# Patient Record
(demographics unavailable — no encounter records)

---

## 2025-07-30 NOTE — HISTORY OF PRESENT ILLNESS
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 Admitted to CCU and treated with Shankar and milrinone. Initiated on Epoprostenol  CT Chest showed centrilobular ground-glass nodules, interlobular septal thickening, and mediastinal lymphadenopathy. Course complicated by pneumothorax following placement of PAC. He underwent bronchoscopy and EBUS on 12/10/24. Lymph node negative for malignancy.  Following stabilization in the ICU patient was transferred to RCU 24. Tested positive for RSV 24 s/p course of Ribavirin. Evaluated by dental 24 for jaw pain and found to have inflamed gingiva around #17 distal and lingual, with generalized plaque accumulation and gross debris/plaque s/p irrigation and cleaning. No active infection noted. RRT on 24 in setting of difficulty with Flolan infusion running through PICC Line. Flolan was subsequently changed to R IJ but then became symptomatic with tachycardia, flushing and chest pain. Patient was transferred to MICU for further monitoring. Transferred back to RCU 24. Left anterior chest wall Pandya catheter placemed for Flolan infusion 24.  Listed: Surgery: BILATERAL LUNG TRANSPLANT 25 Donor: CMV + / EBV + / Toxo - , PHS risk Y/N Recipient: CMV + / EBV + / Toxo - Induction: simluect 20mg x2, medrol 1 g  Total Ischemic Time: RIGHT LUN" LEFT LUN" Extubated:25  Hospital Course: Bilateral lung transplant 25. Extubated on 25. Postoperative course notable for right main stem bronchus with necrotic material and persistent air leak requiring prolonged chest tube placement. Fluid cultures 25 grew penicillium and paecilomyces for which he is on posaconazole. Also developed an acute L IJ DVT treated with lose dose Eliquis. He also tested positive for COVID initially on 3/4/25 treated with a 5 day course of Remdesivir. He remained asymptomatic. Patient discharged home 3/17/25.  TELE-Visit 3/19/2025: Initial visit is remote given positive COVID. He remains asymptomatic. Feels somewhat tired after climbing stairs at home. Also notes some chest tightness around the chest, which was present during his hospital stay. He is taking his medications as instructed using his pill box, which he had on him during the visit. He is taking strict aspiration precautions, which is also being enforced by his family. He is staying with his brother and designated caretaker Jayson, who is also present for the visit. He is ambulating daily without issue. Patient has vitals available for review, which were checked at home. Reported SpO2 97 - 99% on room air. /75. T 97.4 F. Wt 129 lbs.  CLINIC 3/26/2025: Patient presents to clinic for routine follow up appointment post hospital discharge. Recent COVID infection, last RVP negative, SPI improved, best to date, denies respiratory complications. Complained of sore throat/esophageal pain X 3 days. Thyroid assessed, appears normal, thyroid panel and DSA/Allosure drawn. Chest tube sutures removed, incisions healing well, no signs of infection noted. Medications reviewed with patient and brother, patient and brother verbalized understanding.  CLINIC 25: Slight decline in spirometry, not concerning as home sandy trend is stable, DSA/Allosure repeated for further surveillance. Patient complained of sore thrat X1-2 days, denies sick contact, RVP swab sent. Patient also complained of numbness and tingling in hands and fingers, will reassess next week and consider adding gabapentin. IS/Oi medications are stable; no changes this week. Routine labs to be repeated on Monday  CLINIC 25: RVP 4/3 + enterovirus and COVID. Started on a prednisone taper starting at 40 mg daily. Has further decline in spirometry from 2.63 last week to 2.49 today. Allosure was checked 3/26/25 and was 0.06%. DSA 0%. Has mild sore throat. No fevers. No shortness of breath. Patient's brother accompanied him today. Patient reports compliance with medications. His brother states that he is trying to encourage more physical activity.  Readmitted 4/10 - 25 for concern for respiratory symptoms, COVID infection with worsening PFTs. CT chest negative, + enterovirus on RVP. Covid was negative. Transplant ID consulted. Completed empiric course of levaquin inpatient.  CLINIC 2025: Mr. Buck was seen and examined by me and the ACP on 20[facility] at [address] [facility] [address] in a transplant office and I reviewed his data which was extensive the day before the scheduled visitation completely including lab data and radiographic information.  CLINIC 2025: CLINIC 2025: Patient clinically stable, spirometry improved, denies respiratory complications. Cellecpt increased to 500 mg BID. Posa decreased to 200mg as trough is 2.7. Metformin 500mg daily started by Dr. Claire on ; discontinued today as Metformin is not a preferred drug of choice for post-transplant patients. an appointment was facilitated with Dr. Toro on 25.  CLINIC 2025: Patient presents for routine follow up appointment. Reports feeling well. No respiratory complaints. Has intermittent feeling of chest tightness not associated with dyspnea. Also notes stiff legs in the middle of the night which improves during daytime. He reports compliance with medications.  CLINIC 2025: Patient presents for routine follow up appointment. Spirometry improved from last. Denies respiratory complications. Complained of neuropathy around incision sites, incisions are healed, no signs of infection. Patient also endorses leg cramps and stiffness., increase hydration with electrolytes encouraged. Patient states he has moved back to parent's house with his wife and son. Reports wife now helps him with medication and pill box.  CLINIC 2025: Spirometry declined. Stable respiratory status. Denies SOB or cough. Fell yesterday with associated head strike. Has bruise on scalp. Painful to touch. Also has mild right knee pain. Denies LOC. Denies headache. Sent patient to the ER for CT of head. DSA/Allosure drawn and sent today for declined Spirometry. Patient to follow up with Dr. Garcia to evaluate the need of Eliquis. Tacrolimus increased to 0.5mg BID and Cellcept increased to 750mg BID. Medication changes reviewed with patient, med-action updated and provided.  CLINIC 25: Spirometry improved, denies respiratory complications, reports doing well. Patient stated having right leg and right-hand cramps and stiffness, recommended to increase hydration. Pending appointment for Duplex to re-evaluate Left IJ DVT and possibly discontinuing Eliquis.  CLINIC 2025: Patients' spirometry stable, no complaints of respiratory issues. Tacro increased to 1mg BID, c/o muscle spasms, hydration encouraged. PAtient was referred to Neuropsychologist for cognitive impairment and patient has refused appointment. Lower ext dopplers completed- benign; Upper extremity dopplers scheduled for today.  CLINIC 2025: Spirometry improved, no respiratory complaints, reports doing well. Patient continues to complain of muscle spasm of B/L LE at times, hydration encourage and to remain active. Discussed with patient to be compliant with appointments. Updated med-action provided to patient.  CLINIC 2025: Spirometry stable, denies any respiratory complications, reports doing well. Lumbar sacral MRI ordered for continued muscle spasms and numbness to lower extremities. Patient reported heat rash to chest from being outside walking, Calamine lotion or Benadryl cream OTC recommended. Decreased Cellcept to 250mg BID due to neutropenia. updated Med-action provided to patient.  CLINIC 2025: Spirometry stable, denies any respiratory complications, reports doing well. Lumbar sacral MRI ordered for continued muscle spasms and numbness to lower extremities. No changes otherwise. Patient will repeat Tacrolimus level on 7/10/25.   CLINIC 2025: Patient denies respiratory complication; however, SPI declined from last. DSA/Prospera drawn, 6-monh bronch scheduled for tomorrow. Hydration encouraged, patient offers no complaints.   CLINIC 2025: *****   Employment status: [employer]:  DONOR CULTURES: CHECK UNET POD 1,3,7,10,14 2025: respiratory cx: pseudomonas fluoresecens 25 blood cx: ngtd 25 R BAL: <10k staph aureus 25 L BAL: <10k staph aureus 25 urine cx: ngtd 25: bld cx: staph epi?? - I'm not seeing this on unet?  CULTURES: : OR: rare yeast, rare mold, + pseudomonas flurosecens : OR: candida parapsilosis, s/p caspofungin : OR: rare penicillum 25 body fluid: rare candida, rare pseudomonas fluorescens 25 body fluid cx: rare candida parapsilosis, rare penicillium, rare paecilomyces 25: BAL: <10k growth 25 BAL: ngtd 25: NGTD 3/4/25: BAL + COVID (remdesivir 3/5 - 3/10) 3/7/25: RVP + COVID 3/13: RVP + COVID 2025: Rhinovirus->monitor/NGTD  TBBX: 1 MONTH: 3/4/25: A0Bx, C4D neg 3 MONTH: 2025 A0Bx GMS and C4d negative 6 MONTH: 2025 A0B0 GMS and C4d negative 9 MONTH: 10/2025 12 MONTH: 2026   DSA: 1 WEEK: (high risk): 25: 0%, NO DSA 2 WEEK: (high risk): 25: cPRA 0% NO DSA >2000 Late 1 month SENT 3/4/25; no Dsa, CPRA0% Repeated 3/26: 0% 3 MONTH: 2025 0% Repeat 25 0% 6 MONTH: 2025 0% 9 MONTH:10/2025 12 MONTH: 2026  ALLOSURE: Late 1 month: SENT 3/4/25: 0.19% Repeat 3/26: 0.06% Repeated 2025 3 MONTH: 2025 0.06% Repeat: Decline FEV1 2025: 0.08% 6 MONTH: 2025 0.08% 9 MONTH: 10/2025 12 MONTH: 2026   SPIROMETRY  inpatient  FVC 2.1  FEV1 1.82 2025 inpatient  FVC 2.21 FEV1 2.08 2025 inpatient  FVC 1.54 FEV1 1.16 2025 inpatient  FVC 2.07 FEV1 2.05 3/3/2025 inpatient  FVC 1.94 FEV1 1.77 3/10/2025 inpatient  FVC 2.04 FEV1 1.85 3/13/2025 inpatient  FVC 2.21 FEV1 2.06 3/17/2025 inpatient  FVC 2.06 FEV1 1.94 3/26/2025 [facility]: FVC 2.98 FEV1 2.92 2025 800 [facility]: FVC 2.70 FEV1 2.63 2025 800 [facility]: FVC 2.61 FEV1 2.49 2025 800 [facility]: FVC 2.53 FEV1 2.40 2025 800 [facility]: FVC 2.69 FEV1 2.58 2025 410 [facility] FVC 2.65 FEV1 2.58 2025 800 [facility]: FVC 2.71 FEV1 2.66 2025 800 [facility]: FVC 2.32 FEV1 2.25 2025 800 [facility]: FVC 2.87 FEV1 2.73 2025 800 Clinic FVC 2.79 FEV1 2.71 2025 800 [facility]: FVC 2.92 FEV1 2.85 2025 800 [facility]: FVC 2.93 FEV1 2.81. 2025 800 [facility]: FVC 2.96 FEV1 2.81 2025 800 [facility]: FVC 2.87 FEV1 2.67 2025 800 [facility]: ************************

## 2025-07-30 NOTE — END OF VISIT
[Time Spent: ___ minutes] : I have spent [unfilled] minutes of time on the encounter which excludes teaching and separately reported services. [FreeTextEntry3] : 31 year old male presented initially with cardiogenic shock and acute on chronic RH failure in setting of severe Group 1 PAH / PVOD s/p bilateral LT 1/31/2025, here for a follow up visit.  Clinically stable, on room air PE: CTA bilaterally CXR (07/28): no acute cardiopulmonary issues Spirometry: FEV1 stable at 2.70 L Tacro level on 07/28 was 9.2 ng/mL, increased to 1.0/0.5 mg BID this week Cont Cellcept 250 mg BID (reduced 2/2 low WBC) and Prednisone 17.5 mg daily OI PPX: Bactrim, Valcyte, Posaconazole (BAL 2/1 growing penicillium) BAL on 07/17 growing mold, pending speciation Posa level was 1.0 mcg/mL on 07/28, will discuss with transplant ID and pharmacy LTBI: INH/B6. Transplant ID follow up GERD: Aspiration precautions Rest as above

## 2025-07-30 NOTE — HISTORY OF PRESENT ILLNESS
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 Admitted to CCU and treated with Shankar and milrinone. Initiated on Epoprostenol  CT Chest showed centrilobular ground-glass nodules, interlobular septal thickening, and mediastinal lymphadenopathy. Course complicated by pneumothorax following placement of PAC. He underwent bronchoscopy and EBUS on 12/10/24. Lymph node negative for malignancy.  Following stabilization in the ICU patient was transferred to RCU 24. Tested positive for RSV 24 s/p course of Ribavirin. Evaluated by dental 24 for jaw pain and found to have inflamed gingiva around #17 distal and lingual, with generalized plaque accumulation and gross debris/plaque s/p irrigation and cleaning. No active infection noted. RRT on 24 in setting of difficulty with Flolan infusion running through PICC Line. Flolan was subsequently changed to R IJ but then became symptomatic with tachycardia, flushing and chest pain. Patient was transferred to MICU for further monitoring. Transferred back to RCU 24. Left anterior chest wall Pandya catheter placemed for Flolan infusion 24.  Listed: Surgery: BILATERAL LUNG TRANSPLANT 25 Donor: CMV + / EBV + / Toxo - , PHS risk Y/N Recipient: CMV + / EBV + / Toxo - Induction: simluect 20mg x2, medrol 1 g  Total Ischemic Time: RIGHT LUN" LEFT LUN" Extubated:25  Hospital Course: Bilateral lung transplant 25. Extubated on 25. Postoperative course notable for right main stem bronchus with necrotic material and persistent air leak requiring prolonged chest tube placement. Fluid cultures 25 grew penicillium and paecilomyces for which he is on posaconazole. Also developed an acute L IJ DVT treated with lose dose Eliquis. He also tested positive for COVID initially on 3/4/25 treated with a 5 day course of Remdesivir. He remained asymptomatic. Patient discharged home 3/17/25.  TELE-Visit 3/19/2025: Initial visit is remote given positive COVID. He remains asymptomatic. Feels somewhat tired after climbing stairs at home. Also notes some chest tightness around the chest, which was present during his hospital stay. He is taking his medications as instructed using his pill box, which he had on him during the visit. He is taking strict aspiration precautions, which is also being enforced by his family. He is staying with his brother and designated caretaker Jayson, who is also present for the visit. He is ambulating daily without issue. Patient has vitals available for review, which were checked at home. Reported SpO2 97 - 99% on room air. /75. T 97.4 F. Wt 129 lbs.  CLINIC 3/26/2025: Patient presents to clinic for routine follow up appointment post hospital discharge. Recent COVID infection, last RVP negative, SPI improved, best to date, denies respiratory complications. Complained of sore throat/esophageal pain X 3 days. Thyroid assessed, appears normal, thyroid panel and DSA/Allosure drawn. Chest tube sutures removed, incisions healing well, no signs of infection noted. Medications reviewed with patient and brother, patient and brother verbalized understanding.  CLINIC 25: Slight decline in spirometry, not concerning as home sandy trend is stable, DSA/Allosure repeated for further surveillance. Patient complained of sore thrat X1-2 days, denies sick contact, RVP swab sent. Patient also complained of numbness and tingling in hands and fingers, will reassess next week and consider adding gabapentin. IS/Oi medications are stable; no changes this week. Routine labs to be repeated on Monday  CLINIC 25: RVP 4/3 + enterovirus and COVID. Started on a prednisone taper starting at 40 mg daily. Has further decline in spirometry from 2.63 last week to 2.49 today. Allosure was checked 3/26/25 and was 0.06%. DSA 0%. Has mild sore throat. No fevers. No shortness of breath. Patient's brother accompanied him today. Patient reports compliance with medications. His brother states that he is trying to encourage more physical activity.  Readmitted 4/10 - 25 for concern for respiratory symptoms, COVID infection with worsening PFTs. CT chest negative, + enterovirus on RVP. Covid was negative. Transplant ID consulted. Completed empiric course of levaquin inpatient.  CLINIC 2025: Mr. Buck was seen and examined by me and the ACP on 2025 at [address] [facility] [address] in a transplant office and I reviewed his data which was extensive the day before the scheduled visitation completely including lab data and radiographic information.  CLINIC 2025: CLINIC 2025: Patient clinically stable, spirometry improved, denies respiratory complications. Cellecpt increased to 500 mg BID. Posa decreased to 200mg as trough is 2.7. Metformin 500mg daily started by Dr. Claire on ; discontinued today as Metformin is not a preferred drug of choice for post-transplant patients. an appointment was facilitated with Dr. Toro on 25.  CLINIC 2025: Patient presents for routine follow up appointment. Reports feeling well. No respiratory complaints. Has intermittent feeling of chest tightness not associated with dyspnea. Also notes stiff legs in the middle of the night which improves during daytime. He reports compliance with medications.  CLINIC 2025: Patient presents for routine follow up appointment. Spirometry improved from last. Denies respiratory complications. Complained of neuropathy around incision sites, incisions are healed, no signs of infection. Patient also endorses leg cramps and stiffness., increase hydration with electrolytes encouraged. Patient states he has moved back to parent's house with his wife and son. Reports wife now helps him with medication and pill box.  CLINIC 2025: Spirometry declined. Stable respiratory status. Denies SOB or cough. Fell yesterday with associated head strike. Has bruise on scalp. Painful to touch. Also has mild right knee pain. Denies LOC. Denies headache. Sent patient to the ER for CT of head. DSA/Allosure drawn and sent today for declined Spirometry. Patient to follow up with Dr. Garcia to evaluate the need of Eliquis. Tacrolimus increased to 0.5mg BID and Cellcept increased to 750mg BID. Medication changes reviewed with patient, med-action updated and provided.  CLINIC 25: Spirometry improved, denies respiratory complications, reports doing well. Patient stated having right leg and right-hand cramps and stiffness, recommended to increase hydration. Pending appointment for Duplex to re-evaluate Left IJ DVT and possibly discontinuing Eliquis.  CLINIC 2025: Patients' spirometry stable, no complaints of respiratory issues. Tacro increased to 1mg BID, c/o muscle spasms, hydration encouraged. PAtient was referred to Neuropsychologist for cognitive impairment and patient has refused appointment. Lower ext dopplers completed- benign; Upper extremity dopplers scheduled for today.  CLINIC 2025: Spirometry improved, no respiratory complaints, reports doing well. Patient continues to complain of muscle spasm of B/L LE at times, hydration encourage and to remain active. Discussed with patient to be compliant with appointments. Updated med-action provided to patient.  CLINIC 2025: Spirometry stable, denies any respiratory complications, reports doing well. Lumbar sacral MRI ordered for continued muscle spasms and numbness to lower extremities. Patient reported heat rash to chest from being outside walking, Calamine lotion or Benadryl cream OTC recommended. Decreased Cellcept to 250mg BID due to neutropenia. updated Med-action provided to patient.  CLINIC 2025: Spirometry stable, denies any respiratory complications, reports doing well. Lumbar sacral MRI ordered for continued muscle spasms and numbness to lower extremities. No changes otherwise. Patient will repeat Tacrolimus level on 7/10/25.   CLINIC 2025: Patient denies respiratory complication; however, SPI declined from last. DSA/Prospera drawn, 6-monh bronch scheduled for tomorrow. Hydration encouraged, patient offers no complaints.   CLINIC 2025: *****   Employment status: [employer]:  DONOR CULTURES: CHECK UNET POD 1,3,7,10,14 2025: respiratory cx: pseudomonas fluoresecens 25 blood cx: ngtd 25 R BAL: <10k staph aureus 25 L BAL: <10k staph aureus 25 urine cx: ngtd 25: bld cx: staph epi?? - I'm not seeing this on unet?  CULTURES: : OR: rare yeast, rare mold, + pseudomonas flurosecens : OR: candida parapsilosis, s/p caspofungin : OR: rare penicillum 25 body fluid: rare candida, rare pseudomonas fluorescens 25 body fluid cx: rare candida parapsilosis, rare penicillium, rare paecilomyces 25: BAL: <10k growth 25 BAL: ngtd 25: NGTD 3/4/25: BAL + COVID (remdesivir 3/5 - 3/10) 3/7/25: RVP + COVID 3/13: RVP + COVID 2025: Rhinovirus->monitor/NGTD  TBBX: 1 MONTH: 3/4/25: A0Bx, C4D neg 3 MONTH: 2025 A0Bx GMS and C4d negative 6 MONTH: 2025 A0B0 GMS and C4d negative 9 MONTH: 10/2025 12 MONTH: 2026   DSA: 1 WEEK: (high risk): 25: 0%, NO DSA 2 WEEK: (high risk): 25: cPRA 0% NO DSA >2000 Late 1 month SENT 3/4/25; no Dsa, CPRA0% Repeated 3/26: 0% 3 MONTH: 2025 0% Repeat 25 0% 6 MONTH: 2025 0% 9 MONTH:10/2025 12 MONTH: 2026  ALLOSURE: Late 1 month: SENT 3/4/25: 0.19% Repeat 3/26: 0.06% Repeated 2025 3 MONTH: 2025 0.06% Repeat: Decline FEV1 2025: 0.08% 6 MONTH: 2025 0.08% 9 MONTH: 10/2025 12 MONTH: 2026   SPIROMETRY  inpatient  FVC 2.1  FEV1 1.82 2025 inpatient  FVC 2.21 FEV1 2.08 2025 inpatient  FVC 1.54 FEV1 1.16 2025 inpatient  FVC 2.07 FEV1 2.05 3/3/2025 inpatient  FVC 1.94 FEV1 1.77 3/10/2025 inpatient  FVC 2.04 FEV1 1.85 3/13/2025 inpatient  FVC 2.21 FEV1 2.06 3/17/2025 inpatient  FVC 2.06 FEV1 1.94 3/26/2025 [facility]: FVC 2.98 FEV1 2.92 2025 800 [facility]: FVC 2.70 FEV1 2.63 2025 800 [facility]: FVC 2.61 FEV1 2.49 2025 800 [facility]: FVC 2.53 FEV1 2.40 2025 800 [facility]: FVC 2.69 FEV1 2.58 2025 410 [facility] FVC 2.65 FEV1 2.58 2025 800 [facility]: FVC 2.71 FEV1 2.66 2025 800 [facility]: FVC 2.32 FEV1 2.25 2025 800 Clinic: FVC 2.87 FEV1 2.73 2025 800 Clinic FVC 2.79 FEV1 2.71 2025 800 Clinic: FVC 2.92 FEV1 2.85 2025 800 Clinic: FVC 2.93 FEV1 2.81. 2025 800 Clinic: FVC 2.96 FEV1 2.81 2025 800 Clinic: FVC 2.87 FEV1 2.67 2025 800 Clinic: ************************

## 2025-07-30 NOTE — HISTORY OF PRESENT ILLNESS
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 Admitted to CCU and treated with Shankar and milrinone. Initiated on Epoprostenol  CT Chest showed centrilobular ground-glass nodules, interlobular septal thickening, and mediastinal lymphadenopathy. Course complicated by pneumothorax following placement of PAC. He underwent bronchoscopy and EBUS on 12/10/24. Lymph node negative for malignancy.  Following stabilization in the ICU patient was transferred to RCU 24. Tested positive for RSV 24 s/p course of Ribavirin. Evaluated by dental 24 for jaw pain and found to have inflamed gingiva around #17 distal and lingual, with generalized plaque accumulation and gross debris/plaque s/p irrigation and cleaning. No active infection noted. RRT on 24 in setting of difficulty with Flolan infusion running through PICC Line. Flolan was subsequently changed to R IJ but then became symptomatic with tachycardia, flushing and chest pain. Patient was transferred to MICU for further monitoring. Transferred back to RCU 24. Left anterior chest wall Pandya catheter placemed for Flolan infusion 24.  Listed: Surgery: BILATERAL LUNG TRANSPLANT 25 Donor: CMV + / EBV + / Toxo - , PHS risk Y/N Recipient: CMV + / EBV + / Toxo - Induction: simluect 20mg x2, medrol 1 g  Total Ischemic Time: RIGHT LUN" LEFT LUN" Extubated:25  Hospital Course: Bilateral lung transplant 25. Extubated on 25. Postoperative course notable for right main stem bronchus with necrotic material and persistent air leak requiring prolonged chest tube placement. Fluid cultures 25 grew penicillium and paecilomyces for which he is on posaconazole. Also developed an acute L IJ DVT treated with lose dose Eliquis. He also tested positive for COVID initially on 3/4/25 treated with a 5 day course of Remdesivir. He remained asymptomatic. Patient discharged home 3/17/25.  TELE-Visit 3/19/2025: Initial visit is remote given positive COVID. He remains asymptomatic. Feels somewhat tired after climbing stairs at home. Also notes some chest tightness around the chest, which was present during his hospital stay. He is taking his medications as instructed using his pill box, which he had on him during the visit. He is taking strict aspiration precautions, which is also being enforced by his family. He is staying with his brother and designated caretaker Jayson, who is also present for the visit. He is ambulating daily without issue. Patient has vitals available for review, which were checked at home. Reported SpO2 97 - 99% on room air. /75. T 97.4 F. Wt 129 lbs.  CLINIC 3/26/2025: Patient presents to clinic for routine follow up appointment post hospital discharge. Recent COVID infection, last RVP negative, SPI improved, best to date, denies respiratory complications. Complained of sore throat/esophageal pain X 3 days. Thyroid assessed, appears normal, thyroid panel and DSA/Allosure drawn. Chest tube sutures removed, incisions healing well, no signs of infection noted. Medications reviewed with patient and brother, patient and brother verbalized understanding.  CLINIC 25: Slight decline in spirometry, not concerning as home sandy trend is stable, DSA/Allosure repeated for further surveillance. Patient complained of sore thrat X1-2 days, denies sick contact, RVP swab sent. Patient also complained of numbness and tingling in hands and fingers, will reassess next week and consider adding gabapentin. IS/Oi medications are stable; no changes this week. Routine labs to be repeated on Monday  CLINIC 25: RVP 4/3 + enterovirus and COVID. Started on a prednisone taper starting at 40 mg daily. Has further decline in spirometry from 2.63 last week to 2.49 today. Allosure was checked 3/26/25 and was 0.06%. DSA 0%. Has mild sore throat. No fevers. No shortness of breath. Patient's brother accompanied him today. Patient reports compliance with medications. His brother states that he is trying to encourage more physical activity.  Readmitted 4/10 - 25 for concern for respiratory symptoms, COVID infection with worsening PFTs. CT chest negative, + enterovirus on RVP. Covid was negative. Transplant ID consulted. Completed empiric course of levaquin inpatient.  CLINIC 2025: Mr. Buck was seen and examined by me and the ACP on [facility] [address] [facility] [address] in a transplant office and I reviewed his data which was extensive the day before the scheduled visitation completely including lab data and radiographic information.  CLINIC 2025: CLINIC 2025: Patient clinically stable, spirometry improved, denies respiratory complications. Cellecpt increased to 500 mg BID. Posa decreased to 200mg as trough is 2.7. Metformin 500mg daily started by Dr. Claire on ; discontinued today as Metformin is not a preferred drug of choice for post-transplant patients. an appointment was facilitated with Dr. Toro on 25.  CLINIC 2025: Patient presents for routine follow up appointment. Reports feeling well. No respiratory complaints. Has intermittent feeling of chest tightness not associated with dyspnea. Also notes stiff legs in the middle of the night which improves during daytime. He reports compliance with medications.  CLINIC 2025: Patient presents for routine follow up appointment. Spirometry improved from last. Denies respiratory complications. Complained of neuropathy around incision sites, incisions are healed, no signs of infection. Patient also endorses leg cramps and stiffness., increase hydration with electrolytes encouraged. Patient states he has moved back to parent's house with his wife and son. Reports wife now helps him with medication and pill box.  CLINIC 2025: Spirometry declined. Stable respiratory status. Denies SOB or cough. Fell yesterday with associated head strike. Has bruise on scalp. Painful to touch. Also has mild right knee pain. Denies LOC. Denies headache. Sent patient to the ER for CT of head. DSA/Allosure drawn and sent today for declined Spirometry. Patient to follow up with Dr. Garcia to evaluate the need of Eliquis. Tacrolimus increased to 0.5mg BID and Cellcept increased to 750mg BID. Medication changes reviewed with patient, med-action updated and provided.  CLINIC 25: Spirometry improved, denies respiratory complications, reports doing well. Patient stated having right leg and right-hand cramps and stiffness, recommended to increase hydration. Pending appointment for Duplex to re-evaluate Left IJ DVT and possibly discontinuing Eliquis.  CLINIC 2025: Patients' spirometry stable, no complaints of respiratory issues. Tacro increased to 1mg BID, c/o muscle spasms, hydration encouraged. PAtient was referred to Neuropsychologist for cognitive impairment and patient has refused appointment. Lower ext dopplers completed- benign; Upper extremity dopplers scheduled for today.  CLINIC 2025: Spirometry improved, no respiratory complaints, reports doing well. Patient continues to complain of muscle spasm of B/L LE at times, hydration encourage and to remain active. Discussed with patient to be compliant with appointments. Updated med-action provided to patient.  CLINIC 2025: Spirometry stable, denies any respiratory complications, reports doing well. Lumbar sacral MRI ordered for continued muscle spasms and numbness to lower extremities. Patient reported heat rash to chest from being outside walking, Calamine lotion or Benadryl cream OTC recommended. Decreased Cellcept to 250mg BID due to neutropenia. updated Med-action provided to patient.  CLINIC 2025: Spirometry stable, denies any respiratory complications, reports doing well. Lumbar sacral MRI ordered for continued muscle spasms and numbness to lower extremities. No changes otherwise. Patient will repeat Tacrolimus level on 7/10/25.   CLINIC 2025: Patient denies respiratory complication; however, SPI declined from last. DSA/Prospera drawn, 6-monh bronch scheduled for tomorrow. Hydration encouraged, patient offers no complaints.   CLINIC 2025: *****   Employment status: [employer]:  DONOR CULTURES: CHECK UNET POD 1,3,7,10,14 2025: respiratory cx: pseudomonas fluoresecens 25 blood cx: ngtd 25 R BAL: <10k staph aureus 25 L BAL: <10k staph aureus 25 urine cx: ngtd 25: bld cx: staph epi?? - I'm not seeing this on unet?  CULTURES: : OR: rare yeast, rare mold, + pseudomonas flurosecens : OR: candida parapsilosis, s/p caspofungin : OR: rare penicillum 25 body fluid: rare candida, rare pseudomonas fluorescens 25 body fluid cx: rare candida parapsilosis, rare penicillium, rare paecilomyces 25: BAL: <10k growth 25 BAL: ngtd 25: NGTD 3/4/25: BAL + COVID (remdesivir 3/5 - 3/10) 3/7/25: RVP + COVID 3/13: RVP + COVID 2025: Rhinovirus->monitor/NGTD  TBBX: 1 MONTH: 3/4/25: A0Bx, C4D neg 3 MONTH: 2025 A0Bx GMS and C4d negative 6 MONTH: 2025 A0B0 GMS and C4d negative 9 MONTH: 10/2025 12 MONTH: 2026   DSA: 1 WEEK: (high risk): 25: 0%, NO DSA 2 WEEK: (high risk): 25: cPRA 0% NO DSA >2000 Late 1 month SENT 3/4/25; no Dsa, CPRA0% Repeated 3/26: 0% 3 MONTH: 2025 0% Repeat 25 0% 6 MONTH: 2025 0% 9 MONTH:10/2025 12 MONTH: 2026  ALLOSURE: Late 1 month: SENT 3/4/25: 0.19% Repeat 3/26: 0.06% Repeated 2025 3 MONTH: 2025 0.06% Repeat: Decline FEV1 2025: 0.08% 6 MONTH: 2025 0.08% 9 MONTH: 10/2025 12 MONTH: 2026   SPIROMETRY  inpatient  FVC 2.1  FEV1 1.82 2025 inpatient  FVC 2.21 FEV1 2.08 2025 inpatient  FVC 1.54 FEV1 1.16 2025 inpatient  FVC 2.07 FEV1 2.05 3/3/2025 inpatient  FVC 1.94 FEV1 1.77 3/10/2025 inpatient  FVC 2.04 FEV1 1.85 3/13/2025 inpatient  FVC 2.21 FEV1 2.06 3/17/2025 inpatient  FVC 2.06 FEV1 1.94 3/26/2025 [facility]: FVC 2.98 FEV1 2.92 2025 800 [facility]: FVC 2.70 FEV1 2.63 2025 800 Clinic: FVC 2.61 FEV1 2.49 2025 800 Clinic: FVC 2.53 FEV1 2.40 2025 800 Clinic: FVC 2.69 FEV1 2.58 2025 410 Clinic FVC 2.65 FEV1 2.58 2025 800 Clinic: FVC 2.71 FEV1 2.66 2025 800 Clinic: FVC 2.32 FEV1 2.25 2025 800 Clinic: FVC 2.87 FEV1 2.73 2025 800 Clinic FVC 2.79 FEV1 2.71 2025 800 Clinic: FVC 2.92 FEV1 2.85 2025 800 Clinic: FVC 2.93 FEV1 2.81. 2025 800 Clinic: FVC 2.96 FEV1 2.81 2025 800 Clinic: FVC 2.87 FEV1 2.67 2025 800 [facility]: ************************

## 2025-07-30 NOTE — ASSESSMENT
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 S/P Bilateral Lung Transplant 1/31/2025   #Lung TXP - 2/1/25 body fluid cx: rare candida parapsilosis, rare penicillium, rare paecilomyces-> Posa started - 3/4/2025: COVID-> admitted for remdesivir infusion  #IS GOAL 10-15 Tacro level 7/22/2025: 10.9  Tacrolimus 0.5 mg BID - Prednisone 17.5 mg - Cellcept 250mg BID for leukopenia  #OI - Valcyte 450 mg BID - Bactrim SS daily - Posaconazole 200 mg daily   #GI Hx of GERD - Protonix 40 mg daily  #HEM Left IJ DVT Plan: discontinue after 3 months (5/13/25) - Eliquis 2.5 mg BID, started 2/13/25 Repeat duplex prior to stopping   #ID Latent TB therapy - Isoniazid 300 mg daily - Pyridoxine 50 mg daily  #ENDO DMT2 - Januvia 100mg daily, started by endo 4/30/25    FOLLOW UP - Routine weekly labs 8/04/2025 - Spirometry reviewed, ***** - Upcoming 6-month bronchoscopy 7/17/2025 - Upcoming appointment with Dr. Garcia - Continue to follow up with Transplant ID - Continue to follow up with Endocrinology - Continue to follow up with Dr. Smith - Lipidologist   RTC in 1 weeks with [place] & CXR  All questions answered, used teach back method, patient verbalized understanding. Above discussed with Dr. Sweeney and Dr. Cam

## 2025-07-30 NOTE — ASSESSMENT
[FreeTextEntry1] : 32yo male newly dx with PAH & RV failure, former smoker. S/P Bilateral Lung Transplant 1/31/2025   #Lung TXP - 2/1/25 body fluid cx: rare candida parapsilosis, rare penicillium, rare paecilomyces-> Posa started - 3/4/2025: COVID-> admitted for remdesivir infusion  #IS GOAL 10-15 Tacro level 7/22/2025: 10.9  Tacrolimus 0.5 mg BID - Prednisone 17.5 mg - Cellcept 250mg BID for leukopenia  #OI - Valcyte 450 mg BID - Bactrim SS daily - Posaconazole 200 mg daily   #GI Hx of GERD - Protonix 40 mg daily  #HEM Left IJ DVT Plan: discontinue after 3 months (5/13/25) - Eliquis 2.5 mg BID, started 2/13/25 Repeat duplex prior to stopping   #ID Latent TB therapy - Isoniazid 300 mg daily - Pyridoxine 50 mg daily  #ENDO DMT2 - Januvia 100mg daily, started by endo 4/30/25    FOLLOW UP - Routine weekly labs 8/04/2025 - Spirometry reviewed, ***** - Upcoming 6-month bronchoscopy 7/17/2025 - Upcoming appointment with Dr. Garcia - Continue to follow up with Transplant ID - Continue to follow up with Endocrinology - Continue to follow up with Dr. Smith - Lipidologist   RTC in 1 weeks with [place] & CXR  All questions answered, used teach back method, patient verbalized understanding. Above discussed with Dr. Sweeney and Dr. Cam

## 2025-07-30 NOTE — ASSESSMENT
[FreeTextEntry1] : 32yo male newly dx with PAH & RV failure, former smoker. S/P Bilateral Lung Transplant 1/31/2025   #Lung TXP - 2/1/25 body fluid cx: rare candida parapsilosis, rare penicillium, rare paecilomyces-> Posa started - 3/4/2025: COVID-> admitted for remdesivir infusion  #IS GOAL 10-15 Tacro level 7/22/2025: 10.9  Tacrolimus 0.5 mg BID - Prednisone 17.5 mg - Cellcept 250mg BID for leukopenia  #OI - Valcyte 450 mg BID - Bactrim SS daily - Posaconazole 200 mg daily   #GI Hx of GERD - Protonix 40 mg daily  #HEM Left IJ DVT Plan: discontinue after 3 months (5/13/25) - Eliquis 2.5 mg BID, started 2/13/25 Repeat duplex prior to stopping   #ID Latent TB therapy - Isoniazid 300 mg daily - Pyridoxine 50 mg daily  #ENDO DMT2 - Januvia 100mg daily, started by endo 4/30/25    FOLLOW UP - Routine weekly labs 8/04/2025 - Spirometry reviewed, ***** - Upcoming 6-month bronchoscopy 7/17/2025 - Upcoming appointment with Dr. Garcia - Continue to follow up with Transplant ID - Continue to follow up with Endocrinology - Continue to follow up with Dr. Smith - Lipidologist   RTC in 1 weeks with [place] & CXR  All questions answered, used teach back method, patient verbalized understanding. Above discussed with Dr. Sweeney and Dr. Cam